# Patient Record
Sex: FEMALE | Race: BLACK OR AFRICAN AMERICAN | NOT HISPANIC OR LATINO | Employment: FULL TIME | URBAN - METROPOLITAN AREA
[De-identification: names, ages, dates, MRNs, and addresses within clinical notes are randomized per-mention and may not be internally consistent; named-entity substitution may affect disease eponyms.]

---

## 2020-01-14 ENCOUNTER — OFFICE VISIT (OUTPATIENT)
Dept: URGENT CARE | Facility: CLINIC | Age: 25
End: 2020-01-14

## 2020-01-14 VITALS
SYSTOLIC BLOOD PRESSURE: 110 MMHG | HEART RATE: 75 BPM | TEMPERATURE: 98 F | HEIGHT: 67 IN | WEIGHT: 117 LBS | BODY MASS INDEX: 18.36 KG/M2 | DIASTOLIC BLOOD PRESSURE: 75 MMHG | OXYGEN SATURATION: 99 %

## 2020-01-14 DIAGNOSIS — M54.50 ACUTE BILATERAL LOW BACK PAIN WITHOUT SCIATICA: ICD-10-CM

## 2020-01-14 DIAGNOSIS — S16.1XXA STRAIN OF NECK MUSCLE, INITIAL ENCOUNTER: Primary | ICD-10-CM

## 2020-01-14 DIAGNOSIS — S29.019A THORACIC MYOFASCIAL STRAIN, INITIAL ENCOUNTER: ICD-10-CM

## 2020-01-14 DIAGNOSIS — S39.012A STRAIN OF LUMBAR REGION, INITIAL ENCOUNTER: ICD-10-CM

## 2020-01-14 DIAGNOSIS — S16.1XXA STRAIN OF NECK MUSCLE, INITIAL ENCOUNTER: ICD-10-CM

## 2020-01-14 DIAGNOSIS — M62.838 MUSCLE SPASM: ICD-10-CM

## 2020-01-14 PROCEDURE — 72050 X-RAY EXAM NECK SPINE 4/5VWS: CPT | Mod: FY,S$GLB,, | Performed by: RADIOLOGY

## 2020-01-14 PROCEDURE — 99213 PR OFFICE/OUTPT VISIT, EST, LEVL III, 20-29 MIN: ICD-10-PCS | Mod: S$GLB,,, | Performed by: NURSE PRACTITIONER

## 2020-01-14 PROCEDURE — 72070 X-RAY EXAM THORAC SPINE 2VWS: CPT | Mod: FY,S$GLB,, | Performed by: RADIOLOGY

## 2020-01-14 PROCEDURE — 99213 OFFICE O/P EST LOW 20 MIN: CPT | Mod: S$GLB,,, | Performed by: NURSE PRACTITIONER

## 2020-01-14 PROCEDURE — 72110 X-RAY EXAM L-2 SPINE 4/>VWS: CPT | Mod: FY,S$GLB,, | Performed by: RADIOLOGY

## 2020-01-14 PROCEDURE — 72050 XR CERVICAL SPINE COMPLETE 5 VIEW: ICD-10-PCS | Mod: FY,S$GLB,, | Performed by: RADIOLOGY

## 2020-01-14 PROCEDURE — 72070 XR THORACIC SPINE AP LATERAL: ICD-10-PCS | Mod: FY,S$GLB,, | Performed by: RADIOLOGY

## 2020-01-14 PROCEDURE — 72110 XR LUMBAR SPINE COMPLETE 5 VIEW: ICD-10-PCS | Mod: FY,S$GLB,, | Performed by: RADIOLOGY

## 2020-01-14 RX ORDER — MELOXICAM 7.5 MG/1
7.5 TABLET ORAL DAILY
Qty: 20 TABLET | Refills: 0 | Status: SHIPPED | OUTPATIENT
Start: 2020-01-14 | End: 2020-01-20 | Stop reason: ALTCHOICE

## 2020-01-14 RX ORDER — CYCLOBENZAPRINE HCL 5 MG
5 TABLET ORAL 3 TIMES DAILY PRN
Qty: 30 TABLET | Refills: 0 | Status: SHIPPED | OUTPATIENT
Start: 2020-01-14 | End: 2020-01-20 | Stop reason: ALTCHOICE

## 2020-01-14 NOTE — PATIENT INSTRUCTIONS
Back Pain (Acute or Chronic)    Back pain is one of the most common problems. The good news is that most people feel better in 1 to 2 weeks, and most of the rest in 1 to 2 months. Most people can remain active.  People experience and describe pain differently; not everyone is the same.  · The pain can be sharp, stabbing, shooting, aching, cramping or burning.  · Movement, standing, bending, lifting, sitting, or walking may worsen pain.  · It can be localized to one spot or area, or it can be more generalized.  · It can spread or radiate upwards, to the front, or go down your arms or legs (sciatica).  · It can cause muscle spasm.  Most of the time, mechanical problems with the muscles or spine cause the pain. Mechanical problems are usually caused by an injury to the muscles or ligaments. While illness can cause back pain, it is usually not caused by a serious illness. Mechanical problems include:   · Physical activity such as sports, exercise, work, or normal activity  · Overexertion, lifting, pushing, pulling incorrectly or too aggressively  · Sudden twisting, bending, or stretching from an accident, or accidental movement  · Poor posture  · Stretching or moving wrong, without noticing pain at the time  · Poor coordination, lack of regular exercise (check with your doctor about this)  · Spinal disc disease or arthritis  · Stress  Pain can also be related to pregnancy, or illness like appendicitis, bladder or kidney infections, pelvic infections, and many other things.  Acute back pain usually gets better in 1 to 2 weeks. Back pain related to disk disease, arthritis in the spinal joints or spinal stenosis (narrowing of the spinal canal) can become chronic and last for months or years.  Unless you had a physical injury (for example, a car accident or fall) X-rays are usually not needed for the initial evaluation of back pain. If pain continues and does not respond to medical treatment, X-rays and other tests may be  needed.  Home care  Try these home care recommendations:  · When in bed, try to find a position of comfort. A firm mattress is best. Try lying flat on your back with pillows under your knees. You can also try lying on your side with your knees bent up towards your chest and a pillow between your knees.  · At first, do not try to stretch out the sore spots. If there is a strain, it is not like the good soreness you get after exercising without an injury. In this case, stretching may make it worse.  · Avoid prolong sitting, long car rides, or travel. This puts more stress on the lower back than standing or walking.  · During the first 24 to 72 hours after an acute injury or flare up of chronic back pain, apply an ice pack to the painful area for 20 minutes and then remove it for 20 minutes. Do this over a period of 60 to 90 minutes or several times a day. This will reduce swelling and pain. Wrap the ice pack in a thin towel or plastic to protect your skin.  · You can start with ice, then switch to heat. Heat (hot shower, hot bath, or heating pad) reduces pain and works well for muscle spasms. Heat can be applied to the painful area for 20 minutes then remove it for 20 minutes. Do this over a period of 60 to 90 minutes or several times a day. Do not sleep on a heating pad. It can lead to skin burns or tissue damage.  · You can alternate ice and heat therapy. Talk with your doctor about the best treatment for your back pain.  · Therapeutic massage can help relax the back muscles without stretching them.  · Be aware of safe lifting methods and do not lift anything without stretching first.  Medicines  Talk to your doctor before using medicine, especially if you have other medical problems or are taking other medicines.  · You may use over-the-counter medicine as directed on the bottle to control pain, unless another pain medicine was prescribed. If you have chronic conditions like diabetes, liver or kidney disease,  stomach ulcers, or gastrointestinal bleeding, or are taking blood thinners, talk to your doctor before taking any medicine.  · Be careful if you are given a prescription medicines, narcotics, or medicine for muscle spasms. They can cause drowsiness, affect your coordination, reflexes, and judgement. Do not drive or operate heavy machinery.  Follow-up care  Follow up with your healthcare provider, or as advised.   A radiologist will review any X-rays that were taken. Your provide will notify you of any new findings that may affect your care.  Call 911  Call emergency services if any of the following occur:  · Trouble breathing  · Confusion  · Very drowsy or trouble awakening  · Fainting or loss of consciousness  · Rapid or very slow heart rate  · Loss of bowel or bladder control  When to seek medical advice  Call your healthcare provider right away if any of these occur:   · Pain becomes worse or spreads to your legs  · Weakness or numbness in one or both legs  · Numbness in the groin or genital area  Date Last Reviewed: 7/1/2016 © 2000-2017 [employer]. [address] [place] [address]. All rights reserved. This information is not intended as a substitute for professional medical care. Always follow your healthcare professional's instructions.        Back Sprain or Strain    Injury to the muscles (strain) or ligaments (sprain) around the spine can be troubling. Injury may occur after a sudden forceful twisting or bending force such as in a car accident, after a simple awkward movement, or after lifting something heavy with poor body positioning. In any case, muscle spasm is often present and adds to the pain.  Thankfully, most people feel better in 1 to 2 weeks, and most of the rest in 1 to 2 months. Most people can remain active. Unless you had a forceful or traumatic physical injury such as a car accident or fall, X-rays may not be ordered for the first evaluation of a back sprain or  strain. If pain continues and does not respond to medical treatment, your healthcare provider may then order X-rays and other tests.  Home care  The following guidelines will help you care for your injury at home:  · When in bed, try to find a comfortable position. A firm mattress is best. Try lying flat on your back with pillows under your knees. You can also try lying on your side with your knees bent up toward your chest and a pillow between your knees.  · Don't sit for long periods. Try not to take long car rides or take other trips that have you sitting for a long time. This puts more stress on the lower back than standing or walking.  · During the first 24 to 72 hours after an injury or flare-up, apply an ice pack to the painful area for 20 minutes. Then remove it for 20 minutes. Do this for 60 to 90 minutes, or several times a day. This will reduce swelling and pain. Be sure to wrap the ice pack in a thin towel or plastic to protect your skin.  · You can start with ice, then switch to heat. Heat from a hot shower, hot bath, or heating pad reduces pain and works well for muscle spasms. Put heat on the painful area for 20 minutes, then remove for 20 minutes. Do this for 60 to 90 minutes, or several times a day. Do not use a heating pad while sleeping. It can burn the skin.  · You can alternate the ice and heat. Talk with your healthcare provider to find out the best treatment or therapy for your back pain.  · Therapeutic massage will help relax the back muscles without stretching them.  · Be aware of safe lifting methods. Do not lift anything over 15 pounds until all of the pain is gone.  Medicines  Talk to your healthcare provider before using medicines, especially if you have other health problems or are taking other medicines.  · You may use acetaminophen or ibuprofen to control pain, unless another pain medicine was prescribed. If you have chronic conditions like diabetes, liver or kidney disease, stomach  ulcers, or gastrointestinal bleeding, or are taking blood-thinner medicines, talk with your doctor before taking any medicines.  · Be careful if you are given prescription medicines, narcotics, or medicine for muscle spasm. They can cause drowsiness, and affect your coordination, reflexes, and judgment. Do not drive or operate heavy machinery when taking these types of medicines. Only take pain medicine as prescribed by your healthcare provider.  Follow-up care  Follow up with your healthcare provider, or as advised. You may need physical therapy or more tests if your symptoms get worse.  If you had X-rays your healthcare provider may be checking for any broken bones, breaks, or fractures. Bruises and sprains can sometimes hurt as much as a fracture. These injuries can take time to heal completely. If your symptoms dont improve or they get worse, talk with your healthcare provider. You may need a repeat X-ray or other tests.  Call 911  Call for emergency care if any of the following occur:  · Trouble breathing  · Confused  · Very drowsy or trouble awakening  · Fainting or loss of consciousness  · Rapid or very slow heart rate  · Loss of bowel or bladder control  When to seek medical advice  Call your healthcare provider right away if any of the following occur:  · Pain gets worse or spreads to your arms or legs  · Weakness or numbness in one or both arms or legs  · Numbness in the groin or genital area  Date Last Reviewed: 6/1/2016  © 6546-0124 [employer]. [address] [place] [id number]. All rights reserved. This information is not intended as a substitute for professional medical care. Always follow your healthcare professional's instructions.        Muscle Spasm  A muscle spasm (also called a cramp) is an involuntary muscle contraction. The muscle tightens quickly and strongly. A hard lump may form in the muscle. Muscle spasms are very painful. Read on to learn more about muscle spasms  and how to treat and prevent them.    What causes muscles to spasm?  Often, the cause of a muscle spasm is not known. Muscle spasm is due to irritation of muscle fibers. Some things can make a muscle spasm more likely. These include:  · Injury  · Heavy exercise  · Overtired muscles  · A muscle held in one position for a long time  · Dehydration  · Low levels of certain minerals in the body  · Taking certain medications, such as diuretics or water pills  · Certain medical conditions, such as kidney failure or diabetes  · Being pregnant  Stopping a muscle spasm  Muscle spasms often come and go quickly. When a muscle goes into spasm, very gently stretch and massage the muscle. This may help calm the muscle fibers. Then rest the muscle.  Preventing muscle spasms  Although there is little or no evidence that staying hydrated, taking certain vitamins or minerals or stretching works to prevent cramps, these measures may help and have other benefits. Talk to your health care provider about steps to take to avoid muscle spasms. These may include:  · Drinking enough fluids to avoid dehydration, especially when you exercise.  · Taking vitamin or mineral supplements.  · Getting regular exercise.  · Stretching regularly, especially before exercise.  · Limit caffeine and smoking.  · Taking a prescription muscle relaxant.  When to call your doctor  Call your doctor if you have any of the following:  · Severe cramping  · Cramping that lasts a long time, does not go away with stretching, or keeps coming back  · Pain, tingling, or weakness in the arms or legs  · Pain that wakes you up at night   Date Last Reviewed: 9/1/2015  © 1561-8709 [employer]. [address] [place] [address], [place] [address]. All rights reserved. This information is not intended as a substitute for professional medical care. Always follow your healthcare professional's instructions.
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
## 2020-01-15 ENCOUNTER — TELEPHONE (OUTPATIENT)
Dept: URGENT CARE | Facility: CLINIC | Age: 25
End: 2020-01-15

## 2020-01-15 RX ORDER — MELOXICAM 7.5 MG/1
TABLET ORAL
Qty: 90 TABLET | OUTPATIENT
Start: 2020-01-15

## 2020-01-15 NOTE — TELEPHONE ENCOUNTER
Called patient and she answered the phone, I'm getting her scheduled to be seen in the [place] [facility] for a RV.

## 2020-01-16 ENCOUNTER — OFFICE VISIT (OUTPATIENT)
Dept: URGENT CARE | Facility: CLINIC | Age: 25
End: 2020-01-16
Payer: OTHER MISCELLANEOUS

## 2020-01-16 DIAGNOSIS — M54.50 ACUTE BILATERAL LOW BACK PAIN WITHOUT SCIATICA: ICD-10-CM
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
## 2020-01-16 DIAGNOSIS — S39.012A STRAIN OF LUMBAR REGION, INITIAL ENCOUNTER: Primary | ICD-10-CM

## 2020-01-16 DIAGNOSIS — M62.830 LUMBAR PARASPINAL MUSCLE SPASM: ICD-10-CM

## 2020-01-16 PROCEDURE — 99203 PR OFFICE/OUTPT VISIT, NEW, LEVL III, 30-44 MIN: ICD-10-PCS | Mod: S$GLB,,, | Performed by: NURSE PRACTITIONER

## 2020-01-16 PROCEDURE — 99203 OFFICE O/P NEW LOW 30 MIN: CPT | Mod: S$GLB,,, | Performed by: NURSE PRACTITIONER

## 2020-01-16 NOTE — PROGRESS NOTES
Subjective:       Patient ID: Evelina Baez is a 24 y.o. female.    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 She states she first felt a catch in her back when lifting the first then lifted a second and it got worse. She states she completed her shift. The next day at work she did a formal report because could not work. W/C f/u- Lower back- Patient was seen at  Urgent Care, patient was seen on 1/14/2020 for injury. She injured her back trying to catch heavy boxes from falling and also lifting box. Today she comes in with pain in her lower back that seems to have increased with muscle spasms. Pain level 9/10 on today. She is currently taking Flexeril and Meloxicam. [place]    Back Pain   This is a new problem. The current episode started in the past 7 days. The problem occurs constantly. The problem has been gradually worsening since onset. The pain is present in the lumbar spine. The quality of the pain is described as burning, stabbing and shooting. The pain does not radiate (radiates up to her mid-back). The pain is at a severity of 9/10. The pain is severe. The pain is worse during the night. The symptoms are aggravated by lying down. Stiffness is present at night. Pertinent negatives include no abdominal pain, bladder incontinence, bowel incontinence, chest pain, dysuria, headaches, leg pain, numbness, paresis, paresthesias, pelvic pain, perianal numbness, tingling, weakness or weight loss. She has tried muscle relaxant, NSAIDs and ice for the symptoms. The treatment provided mild relief.       Constitution: Negative for fatigue.   HENT: Negative.    Neck: negative.   Cardiovascular: Negative.  Negative for chest pain.   Eyes: Negative.    Respiratory: Negative.    Gastrointestinal: Negative for abdominal pain and bowel incontinence.   Genitourinary: Negative for dysuria,  urgency, bladder incontinence, hematuria and pelvic pain.   Musculoskeletal: Positive for pain, back pain and muscle cramps. Negative for history of spine disorder.   Skin: Negative for rash.   Allergic/Immunologic: Negative.    Neurological: Negative for coordination disturbances, headaches, numbness and tingling.   Hematologic/Lymphatic: Negative.    Psychiatric/Behavioral: Negative.         Objective:      Physical Exam   Musculoskeletal:        Lumbar back: She exhibits tenderness, pain and spasm.        Back:        Assessment:       1. Strain of lumbar region, initial encounter    2. Acute bilateral low back pain without sciatica    3. Lumbar paraspinal muscle spasm    4. Work related injury        Plan:       Work status per CA17     Patient Instructions: Attention not to aggravate affected area, Apply ice 24-48 hours then apply heat/warm soaks, Daily home exercises/warm soaks   Restrictions: Disabled until next office visit(on muscle relaxants for spasm; do not operate heavy machinery and drive while taking medication)  Follow up in about 4 days (around 1/20/2020).

## 2020-01-16 NOTE — LETTER
[facility]  [address]  [place] [address]  Phone: [phone]  Fax: [phone]  [facility]: [id number]    Pt Name: Evelina Baez  Injury Date: 01/13/2020   Employee ID: [id number] Date of First Treatment: 01/16/2020   Company: [employer]      Appointment Time: 11:30 AM Arrived: 11:30 AM   Provider: ARIAS Monaco Time Out: 12:55 PM     Office Treatment:   EXAM  DISABLED UNTIL NEXT OFFICE VISIT     1. Strain of lumbar region, initial encounter    2. Acute bilateral low back pain without sciatica    3. Lumbar paraspinal muscle spasm    4. Work related injury          Patient Instructions: Attention not to aggravate affected area, Apply ice 24-48 hours then apply heat/warm soaks, Daily home exercises/warm soaks    Restrictions: Disabled until next office visit(on muscle relaxants for spasm; do not operate heavy machinery and drive while taking medication)     Return Appointment: 01/20/2020 at 9:30 AM  EDWIN

## 2020-01-20 ENCOUNTER — OFFICE VISIT (OUTPATIENT)
Dept: URGENT CARE | Facility: CLINIC | Age: 25
End: 2020-01-20
Payer: OTHER MISCELLANEOUS

## 2020-01-20 DIAGNOSIS — S39.012D STRAIN OF LUMBAR REGION, SUBSEQUENT ENCOUNTER: ICD-10-CM

## 2020-01-20 DIAGNOSIS — M54.50 ACUTE BILATERAL LOW BACK PAIN WITHOUT SCIATICA: Primary | ICD-10-CM

## 2020-01-20 DIAGNOSIS — M62.830 LUMBAR PARASPINAL MUSCLE SPASM: ICD-10-CM

## 2020-01-20 PROCEDURE — 99214 PR OFFICE/OUTPT VISIT, EST, LEVL IV, 30-39 MIN: ICD-10-PCS | Mod: S$GLB,,, | Performed by: NURSE PRACTITIONER

## 2020-01-20 PROCEDURE — 99214 OFFICE O/P EST MOD 30 MIN: CPT | Mod: S$GLB,,, | Performed by: NURSE PRACTITIONER

## 2020-01-20 RX ORDER — NAPROXEN 500 MG/1
500 TABLET ORAL 2 TIMES DAILY
Qty: 30 TABLET | Refills: 0 | Status: SHIPPED | OUTPATIENT
Start: 2020-01-20 | End: 2020-02-05 | Stop reason: SDUPTHER

## 2020-01-20 RX ORDER — PREDNISONE 20 MG/1
40 TABLET ORAL DAILY
Qty: 10 TABLET | Refills: 0 | Status: SHIPPED | OUTPATIENT
Start: 2020-01-20 | End: 2020-01-25

## 2020-01-20 RX ORDER — METHOCARBAMOL 500 MG/1
1000 TABLET, FILM COATED ORAL 3 TIMES DAILY PRN
Qty: 30 TABLET | Refills: 0 | Status: SHIPPED | OUTPATIENT
Start: 2020-01-20 | End: 2020-01-27

## 2020-01-20 NOTE — LETTER
[facility]  [address] [place] [address]  Phone: [phone]  Fax: [phone]  [facility]: [id number]    Pt Name: Evelina Baez  Injury Date: 01/13/2020   Employee ID: [id number] Date of Treatment: 01/20/2020   Company: [employer]      Appointment Time: 09:15 AM Arrived: 9:15 a.m.   Provider: ARIAS Monaco Time Out: 9:49 a.m.     Office Treatment:   EXAM  Prescriptions  Restrictions: No Prolonged standing/walking, No lifting/pushing/pulling more than 10 lbs, Avoid frequent bending/lifting/twisting, Avoid climbing/kneeling/squatting  Patient Instructions: Attention not to aggravate affected area, Apply ice 24-48 hours then apply heat/warm soaks    1. Acute bilateral low back pain without sciatica    2. Lumbar paraspinal muscle spasm    3. Strain of lumbar region, subsequent encounter      Medications Ordered This Encounter   Medications    methocarbamol (ROBAXIN) 500 MG Tab    naproxen (EC NAPROSYN) 500 MG EC tablet    predniSONE (DELTASONE) 20 MG tablet      Patient Instructions: Attention not to aggravate affected area, Apply ice 24-48 hours then apply heat/warm soaks    Restrictions: No Prolonged standing/walking, No lifting/pushing/pulling more than 10 lbs, Avoid frequent bending/lifting/twisting, Avoid climbing/kneeling/squatting     Return Appointment: 1/27/2020 at 10:30 a.m.  [place]

## 2020-01-20 NOTE — LETTER
[facility]  [place]  [address] [place] [place] [address]  Phone: [phone]  Fax: [phone]  [facility]: [id number]     Name: Evelina Mcnair Date: 01/13/2020   Employee ID: [id number] Date of Treatment: 01/20/2020   Company:  [employer]      Appointment Time: 09:15 AM Arrived: 9:15 a.m.   Provider: ARIAS Monaco Time Out: 9:49 a.m.     Office Treatment:   EXAM  Prescriptions  Restrictions: Disabled until next office visit  Patient Instructions: Attention not to aggravate affected area, Apply ice 24-48 hours then apply heat/warm soaks    1. Acute bilateral low back pain without sciatica    2. Lumbar paraspinal muscle spasm    3. Strain of lumbar region, subsequent encounter      Medications Ordered This Encounter   Medications    methocarbamol (ROBAXIN) 500 MG Tab    naproxen (EC NAPROSYN) 500 MG EC tablet    predniSONE (DELTASONE) 20 MG tablet      Patient Instructions: Attention not to aggravate affected area, Apply ice 24-48 hours then apply heat/warm soaks    Restrictions: Disabled until next office visit     Return Appointment: 1/27/2020 at 10:30 a.m.  [place]

## 2020-01-20 NOTE — PATIENT INSTRUCTIONS
Exercises to Strengthen Your Lower Back  Strong lower back and abdominal muscles work together to support your spine. The exercises below will help strengthen the lower back. It is important that you begin exercising slowly and increase levels gradually.  Always begin any exercise program with stretching. If you feel pain while doing any of these exercises, stop and talk to your doctor about a more specific exercise program that better suits your condition.   Low back stretch  The point of stretching is to make you more flexible and increase your range of motion. Stretch only as much as you are able. Stretch slowly. Do not push your stretch to the limit. If at any point you feel pain while stretching, this is your (temporary) limit.  · Lie on your back with your knees bent and both feet on the ground.  · Slowly raise your left knee to your chest as you flatten your lower back against the floor. Hold for 5 seconds.  · Relax and repeat the exercise with your right knee.  · Do 10 of these exercises for each leg.  · Repeat hugging both knees to your chest at the same time.  Building lower back strength  Start your exercise routine with 10 to 30 minutes a day, 1 to 3 times a day.  Initial exercises  Lying on your back:  1. Ankle pumps: Move your foot up and down, towards your head, and then away. Repeat 10 times with each foot.  2. Heel slides: Slowly bend your knee, drawing the heel of your foot towards you. Then slide your heel/foot from you, straightening your knee. Do not lift your foot off the floor (this is not a leg lift).  3. Abdominal contraction: Bend your knees and put your hands on your stomach. Tighten your stomach muscles. Hold for 5 seconds, then relax. Repeat 10 times.  4. Straight leg raise: Bend one leg at the knee and keep the other leg straight. Tighten your stomach muscles. Slowly lift your straight leg 6 to 12 inches off the floor and hold for up to 5 seconds. Repeat 10 times on each  side.  Standin. Wall squats: Stand with your back against the wall. Move your feet about 12 inches away from the wall. Tighten your stomach muscles, and slowly bend your knees until they are at about a 45 degree angle. Do not go down too far. Hold about 5 seconds. Then slowly return to your starting position. Repeat 10 times.  2. Heel raises: Stand facing the wall. Slowly raise the heels of your feet up and down, while keeping your toes on the floor. If you have trouble balancing, you can touch the wall with your hands. Repeat 10 times.  More advanced exercises  When you feel comfortable enough, try these exercises.  1. Kneeling lumbar extension: Begin on your hands and knees. At the same time, raise and straighten your right arm and left leg until they are parallel to the ground. Hold for 2 seconds and come back slowly to a starting position. Repeat with left arm and right leg, alternating 10 times.  2. Prone lumbar extension: Lie face down, arms extended overhead, palms on the floor. At the same time, raise your right arm and left leg as high as comfortably possible. Hold for 10 seconds and slowly return to start. Repeat with left arm and right leg, alternating 10 times. Gradually build up to 20 times. (Advanced: Repeat this exercise raising both arms and both legs a few inches off the floor at the same time. Hold for 5 seconds and release.)  3. Pelvic tilt: Lie on the floor on your back with your knees bent at 90 degrees. Your feet should be flat on the floor. Inhale, exhale, then slowly contract your abdominal muscles bringing your navel toward your spine. Let your pelvis rock back until your lower back is flat on the floor. Hold for 10 seconds while breathing smoothly.  4. Abdominal crunch: Perform a pelvic tilt (above) flattening your lower back against the floor. Holding the tension in your abdominal muscles, take another breath and raise your shoulder blades off the ground (this is not a full sit-up).  Keep your head in line with your body (dont bend your neck forward). Hold for 2 seconds, then slowly lower.  Date Last Reviewed: 6/1/2016  © 9668-1503 [employer]. [address] [place] [address], [place] [address]. All rights reserved. This information is not intended as a substitute for professional medical care. Always follow your healthcare professional's instructions.

## 2020-01-29 ENCOUNTER — TELEPHONE (OUTPATIENT)
Dept: URGENT CARE | Facility: CLINIC | Age: 25
End: 2020-01-29

## 2020-01-29 ENCOUNTER — OFFICE VISIT (OUTPATIENT)
Dept: URGENT CARE | Facility: CLINIC | Age: 25
End: 2020-01-29

## 2020-01-29 DIAGNOSIS — S29.019D THORACIC MYOFASCIAL STRAIN, SUBSEQUENT ENCOUNTER: ICD-10-CM

## 2020-01-29 DIAGNOSIS — S39.012D STRAIN OF LUMBAR REGION, SUBSEQUENT ENCOUNTER: Primary | ICD-10-CM
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
## 2020-01-29 PROCEDURE — 99214 PR OFFICE/OUTPT VISIT, EST, LEVL IV, 30-39 MIN: ICD-10-PCS | Mod: S$GLB,,, | Performed by: PHYSICIAN ASSISTANT

## 2020-01-29 PROCEDURE — 99214 OFFICE O/P EST MOD 30 MIN: CPT | Mod: S$GLB,,, | Performed by: PHYSICIAN ASSISTANT

## 2020-01-29 RX ORDER — TIZANIDINE 4 MG/1
4 TABLET ORAL 2 TIMES DAILY PRN
Qty: 20 TABLET | Refills: 0 | Status: SHIPPED | OUTPATIENT
Start: 2020-01-29

## 2020-01-29 NOTE — LETTER
[facility]  [address] [place] [address]  Phone: [phone]  Fax: [phone]  [facility]: [id number]    Pt Name: Evelina Baez  Injury Date: 01/13/2020   Employee ID: [id number] Date of  Treatment: 01/29/2020   Company: [employer]      Appointment Time: 10:45 AM Arrived: 11:05 AM   Provider: Raúl Jenkins PA-C Time Out: 12:00 PM     Office Treatment:   EXAM  RX GIVEN  LIGHT DUTY     1. Strain of lumbar region, subsequent encounter    2. Thoracic myofascial strain, subsequent encounter    3. Work related injury      Medications Ordered This Encounter   Medications    tiZANidine (ZANAFLEX) 4 MG tablet      Patient Instructions: Daily home exercises/warm soaks, PT to be scheduled once authorized(Continue naproxen twice daily. Stop Methocarbamol)    Restrictions: (See CA-17)     Return Appointment: 02/05/2020 at 10:00 AM  EDWIN

## 2020-01-29 NOTE — PROGRESS NOTES
Subjective:       Patient ID: Evelina Baez is a 24 y.o. female.    Chief Complaint: Back Pain    W/C f/u- Lower back- Patient states her back does not feel any better. She states she has muscle spasms radiating up her back. Medication does not seem to be working. Pain level 6/10 on today. IJ    Back Pain   This is a recurrent problem. The current episode started 1 to 4 weeks ago. The problem occurs daily. The problem has been gradually improving since onset. The pain is present in the lumbar spine. The quality of the pain is described as stabbing and burning. The pain is at a severity of 6/10. The pain is moderate. The pain is worse during the night. The symptoms are aggravated by lying down and twisting. Stiffness is present at night. Pertinent negatives include no abdominal pain, bladder incontinence, bowel incontinence, chest pain, dysuria, fever, headaches, leg pain, numbness, paresis, paresthesias, pelvic pain, perianal numbness, tingling or weakness. She has tried heat, ice and NSAIDs for the symptoms. The treatment provided mild relief.       Constitution: Negative for fatigue and fever.   Cardiovascular: Negative for chest pain.   Gastrointestinal: Negative for abdominal pain and bowel incontinence.   Genitourinary: Negative for dysuria, urgency, bladder incontinence, hematuria and pelvic pain.   Musculoskeletal: Positive for pain and back pain. Negative for muscle cramps and history of spine disorder.   Skin: Negative for rash.   Neurological: Negative for coordination disturbances, headaches, numbness and tingling.        Objective:      Physical Exam   Constitutional: She appears well-developed and well-nourished. She is active. No distress.   HENT:   Head: Normocephalic and atraumatic.   Right Ear: Hearing and external ear normal.   Left Ear: Hearing and external ear normal.   Nose: Nose normal. No nasal deformity. No epistaxis.   Mouth/Throat: Oropharynx is clear and moist and mucous membranes are  normal.   Eyes: Conjunctivae and lids are normal. Right conjunctiva is not injected. Left conjunctiva is not injected.   Neck: Trachea normal and normal range of motion. No spinous process tenderness and no muscular tenderness present.   Cardiovascular: Intact distal pulses and normal pulses.   Pulses:       Dorsalis pedis pulses are 2+ on the right side, and 2+ on the left side.        Posterior tibial pulses are 2+ on the right side, and 2+ on the left side.   Pulmonary/Chest: Effort normal. No stridor. No respiratory distress.   Abdominal: Normal appearance.   Musculoskeletal:        Cervical back: Normal.        Thoracic back: Normal.        Lumbar back: She exhibits decreased range of motion and tenderness. She exhibits no deformity and normal pulse.        Back:    Neurological: She is alert. She has normal strength and normal reflexes. No sensory deficit. GCS eye subscore is 4. GCS verbal subscore is 5. GCS motor subscore is 6.   Reflex Scores:       Patellar reflexes are 2+ on the right side and 2+ on the left side.       Achilles reflexes are 2+ on the right side and 2+ on the left side.  SLR negative bilaterally.    Skin: Skin is warm, dry and intact. No abrasion and no bruising noted.   Psychiatric: She has a normal mood and affect. Her speech is normal and behavior is normal. Thought content normal. Cognition and memory are normal. She is attentive.   Nursing note and vitals reviewed.      Assessment:       1. Strain of lumbar region, subsequent encounter    2. Thoracic myofascial strain, subsequent encounter    3. Work related injury        Plan:         Medications Ordered This Encounter   Medications    tiZANidine (ZANAFLEX) 4 MG tablet     Sig: Take 1 tablet (4 mg total) by mouth 2 (two) times daily as needed (muscle spasms). Take off duty only. May cause drowsiness.     Dispense:  20 tablet     Refill:  0     Patient Instructions: Daily home exercises/warm soaks, PT to be scheduled once  authorized(Continue naproxen twice daily. Stop Methocarbamol)   Restrictions: (See CA-17)  Follow up in about 1 week (around 2/5/2020).        Patient Instructions       Understanding Lumbosacral Strain    Lumbosacral strain is a medical term for an injury that causes low back pain. The lumbosacral area (low back) is between the bottom of the ribcage and the top of the buttocks. A strain is tearing of muscles and tendons. These tears can be very small but still cause pain.  How a lumbosacral strain happens  Muscles and tendons connected to the spine can be strained in a number of ways:  · Sitting or standing in the same position for long periods of time. This can harm the low back over time. Poor posture can make low back pain more likely.  · Moving the muscles and tendons past their usual range of motion. This can cause a sudden injury. This can happen when you twist, bend over, or lift something heavy. Not using correct technique for sports or tasks like lifting can make back injury more likely.  · Accidents or falls  Lumbosacral strain can be caused by other problems, but these are less common.  Symptoms of lumbosacral strain  Symptoms may include:  · Pain in the back, often on one side  · Pain that gets worse with movement and gets better with rest  · Inability to move as freely as usual  · Swelling, slight redness, and skin warmth in the painful area  Treatment for lumbosacral strain  Low back pain often goes away by itself within several weeks. But it often comes back. Treatment focuses on reducing pain and avoiding further injury. Bed rest is usually not recommended for low back pain. Treatments may include:  · Avoiding or changing the action that caused the problem. This helps prevent injuring the tissues again.  · Prescription or over-the-counter pain medicines. These help reduce inflammation, swelling, and pain.  · Cold or heat packs. These help reduce pain and swelling.  · Stretching and other exercises.  These improve flexibility and strength.  · Physical therapy. This usually includes exercises and other treatments.  · Injections of medicine. This may relieve symptoms.  If these treatments do not relieve symptoms, your healthcare provider may order imaging tests to learn more about the problem. Sometimes you may need surgery.  Possible complications of lumbosacral strain  If the cause of the pain is not addressed, symptoms may return or get worse. Follow your healthcare providers instructions on lifestyle changes and treating your back.     When to call your healthcare provider  Call your healthcare provider right away if you have any of these:  · Fever of 100.4°F (38°C) or higher, or as directed  · Numbness, tingling, or weakness  · Problems with bowel or bladder control, or problems having sex  · Pain that does not go away, or gets worse  · New symptoms   Date Last Reviewed: 3/10/2016  © 4548-5206 [employer]. [address] [place] [address]. All rights reserved. This information is not intended as a substitute for professional medical care. Always follow your healthcare professional's instructions.        Lumbar Stretch (Flexibility)    1. Lie on your back on the floor, with your knees bent and your feet flat on the floor. Dont press your neck or lower back to the floor.  2. Pull one knee up toward your chest. Clasp your hands under your thigh to help pull.  3. Hold for 30 to 60 seconds. Lower your leg back down to the floor.  4. Repeat 2 times, or as instructed.  5. Switch legs and repeat.  Date Last Reviewed: 3/10/2016  © 4322-9266 [employer]. [address] [place] [address]. All rights reserved. This information is not intended as a substitute for professional medical care. Always follow your healthcare professional's instructions.        Lumbar Flexion (Flexibility)    6. Lie on your back on the floor, with your knees bent and your feet flat on the  floor.  7. Gently pull your knees up toward your chest. Put your hands under your thighs to help pull your knees up.  8. Press your lower back down to the floor. Hold for 20 seconds.  9. Lower your legs back down to the floor and relax.  10. Repeat 2 times, or as instructed.  Date Last Reviewed: 3/10/2016  © 1377-0221 [employer]. [address] [place] [address], [place] [id number]. All rights reserved. This information is not intended as a substitute for professional medical care. Always follow your healthcare professional's instructions.

## 2020-01-29 NOTE — TELEPHONE ENCOUNTER
Pt auth to release records completed and emailed to pt at her request. Pt also requested additional days off wanting to return to work on next week, informed pt that I discussed with provider and he was not going to be able to change her release. flavia

## 2020-01-29 NOTE — PATIENT INSTRUCTIONS
Understanding Lumbosacral Strain    Lumbosacral strain is a medical term for an injury that causes low back pain. The lumbosacral area (low back) is between the bottom of the ribcage and the top of the buttocks. A strain is tearing of muscles and tendons. These tears can be very small but still cause pain.  How a lumbosacral strain happens  Muscles and tendons connected to the spine can be strained in a number of ways:  · Sitting or standing in the same position for long periods of time. This can harm the low back over time. Poor posture can make low back pain more likely.  · Moving the muscles and tendons past their usual range of motion. This can cause a sudden injury. This can happen when you twist, bend over, or lift something heavy. Not using correct technique for sports or tasks like lifting can make back injury more likely.  · Accidents or falls  Lumbosacral strain can be caused by other problems, but these are less common.  Symptoms of lumbosacral strain  Symptoms may include:  · Pain in the back, often on one side  · Pain that gets worse with movement and gets better with rest  · Inability to move as freely as usual  · Swelling, slight redness, and skin warmth in the painful area  Treatment for lumbosacral strain  Low back pain often goes away by itself within several weeks. But it often comes back. Treatment focuses on reducing pain and avoiding further injury. Bed rest is usually not recommended for low back pain. Treatments may include:  · Avoiding or changing the action that caused the problem. This helps prevent injuring the tissues again.  · Prescription or over-the-counter pain medicines. These help reduce inflammation, swelling, and pain.  · Cold or heat packs. These help reduce pain and swelling.  · Stretching and other exercises. These improve flexibility and strength.  · Physical therapy. This usually includes exercises and other treatments.  · Injections of medicine. This may relieve  symptoms.  If these treatments do not relieve symptoms, your healthcare provider may order imaging tests to learn more about the problem. Sometimes you may need surgery.  Possible complications of lumbosacral strain  If the cause of the pain is not addressed, symptoms may return or get worse. Follow your healthcare providers instructions on lifestyle changes and treating your back.     When to call your healthcare provider  Call your healthcare provider right away if you have any of these:  · Fever of 100.4°F (38°C) or higher, or as directed  · Numbness, tingling, or weakness  · Problems with bowel or bladder control, or problems having sex  · Pain that does not go away, or gets worse  · New symptoms   Date Last Reviewed: 3/10/2016  © 8351-4406 [employer]. [address] [place] [address]. All rights reserved. This information is not intended as a substitute for professional medical care. Always follow your healthcare professional's instructions.        Lumbar Stretch (Flexibility)    1. Lie on your back on the floor, with your knees bent and your feet flat on the floor. Dont press your neck or lower back to the floor.  2. Pull one knee up toward your chest. Clasp your hands under your thigh to help pull.  3. Hold for 30 to 60 seconds. Lower your leg back down to the floor.  4. Repeat 2 times, or as instructed.  5. Switch legs and repeat.  Date Last Reviewed: 3/10/2016  © 6793-6832 [employer]. [address] [place] [address]. All rights reserved. This information is not intended as a substitute for professional medical care. Always follow your healthcare professional's instructions.        Lumbar Flexion (Flexibility)    6. Lie on your back on the floor, with your knees bent and your feet flat on the floor.  7. Gently pull your knees up toward your chest. Put your hands under your thighs to help pull your knees up.  8. Press your lower back down to the floor. Hold  for 20 seconds.  9. Lower your legs back down to the floor and relax.  10. Repeat 2 times, or as instructed.  Date Last Reviewed: 3/10/2016  © 5854-4788 [employer]. [address] [place] [address], [place] [address]. All rights reserved. This information is not intended as a substitute for professional medical care. Always follow your healthcare professional's instructions.

## 2020-01-30 ENCOUNTER — OFFICE VISIT (OUTPATIENT)
Dept: URGENT CARE | Facility: CLINIC | Age: 25
End: 2020-01-30

## 2020-01-30 DIAGNOSIS — S39.012D STRAIN OF LUMBAR REGION, SUBSEQUENT ENCOUNTER: Primary | ICD-10-CM

## 2020-01-30 DIAGNOSIS — S29.019D THORACIC MYOFASCIAL STRAIN, SUBSEQUENT ENCOUNTER: ICD-10-CM

## 2020-01-30 PROCEDURE — 99214 PR OFFICE/OUTPT VISIT, EST, LEVL IV, 30-39 MIN: ICD-10-PCS | Mod: S$GLB,,, | Performed by: PREVENTIVE MEDICINE

## 2020-01-30 PROCEDURE — 99214 OFFICE O/P EST MOD 30 MIN: CPT | Mod: S$GLB,,, | Performed by: PREVENTIVE MEDICINE

## 2020-01-30 NOTE — LETTER
[facility]  [address] [place] [address]  Phone: [phone]  Fax: [phone]  [facility]: [id number]    Pt Name: Evelina Baez  Injury Date: 01/13/2020   Employee ID: [id number] Date of Treatment: 01/30/2020   Company: [employer]      Appointment Time: 01:20 PM Arrived: 1:30 PM   Provider: Chapito Monson MD Time Out: 3:10 PM     Office Treatment:   EXAM  DISABLED UNTIL 02/02/2020  LIGHT DUTY BEGAN 02/03/2020    1. Strain of lumbar region, subsequent encounter    2. Thoracic myofascial strain, subsequent encounter          Patient Instructions: Daily home exercises/warm soaks    Restrictions: Disabled until next office visit, No lifting/pushing/pulling more than 10 lbs(Patient will be disabled from work until February 3rd 2020 she will return to light duty at that time.)     Return Appointment: 02/05/2020 at 10:30 AM  EDWIN

## 2020-01-30 NOTE — PROGRESS NOTES
Subjective:       Patient ID: Evelina Baez is a 24 y.o. female.    Chief Complaint: Back Pain    W/C f/u- Lower back- Patient states her back does not feel any better. She states she has muscle spasms radiating up her back. Pain level 10/10 on today. IJ    Back Pain   This is a recurrent problem. The current episode started 1 to 4 weeks ago. The problem occurs daily. The problem has been gradually improving since onset. The pain is present in the lumbar spine. The quality of the pain is described as stabbing and burning. The pain is at a severity of 10/10. The pain is moderate. The pain is worse during the night. The symptoms are aggravated by lying down and twisting. Stiffness is present at night. Pertinent negatives include no abdominal pain, bladder incontinence, bowel incontinence, chest pain, dysuria, fever, headaches, leg pain, numbness, paresis, paresthesias, pelvic pain, perianal numbness, tingling or weakness. She has tried heat, ice and NSAIDs for the symptoms. The treatment provided mild relief.       Constitution: Negative for fever.   Cardiovascular: Negative for chest pain.   Gastrointestinal: Negative for abdominal pain and bowel incontinence.   Genitourinary: Negative for dysuria, bladder incontinence and pelvic pain.   Musculoskeletal: Positive for back pain.   Neurological: Negative for headaches and numbness.        Objective:      Physical Exam   Constitutional: She appears well-developed and well-nourished.   HENT:   Head: Normocephalic and atraumatic.   Eyes: Pupils are equal, round, and reactive to light. EOM are normal.   Neck: Normal range of motion. Neck supple.   Cardiovascular: Normal rate and regular rhythm.   Pulmonary/Chest: Effort normal and breath sounds normal.   Musculoskeletal:        Thoracic back: She exhibits decreased range of motion, tenderness and pain. She exhibits no bony tenderness, no swelling, no edema, no deformity and no laceration.        Lumbar back: She  exhibits decreased range of motion, tenderness and pain. She exhibits no bony tenderness, no swelling, no edema, no deformity, no laceration, no spasm and normal pulse.        Back:    Patient has increased pain about her lower back with flexion of her back to 45°, extension to 25°, and lateral bending and rotation to 25°.  She has no swelling or ecchymosis noted about her lower back.  She has complaints of pain with light touch about the lumbar and thoracic spine.  She has no focal neurologic deficits about her lower extremities.    Patient does show evidence of symptom magnification with disproportion complaints of pain on light touch about her lower back.  Pain with axial loading and pseudo trunk rotation.   Neurological: She is alert.   No focal neurologic deficits   Skin: Skin is warm.   Psychiatric: She has a normal mood and affect.   Nursing note and vitals reviewed.      Assessment:       1. Strain of lumbar region, subsequent encounter    2. Thoracic myofascial strain, subsequent encounter        Plan:     this patient will continue taking Naprosyn 500 mg b.i.d. with food during the day.  She will take tizanidine at night as needed.  She was instructed to do exercises for her lower back with warm soaks on a daily basis.  She is scheduled for physical therapy once this is authorized.       Patient Instructions: Daily home exercises/warm soaks   Restrictions: Disabled until next office visit, No lifting/pushing/pulling more than 10 lbs(Patient will be disabled from work until February 3rd 2020 she will return to light duty at that time.)  Follow up in about 6 days (around 2/5/2020).

## 2020-02-05 ENCOUNTER — OFFICE VISIT (OUTPATIENT)
Dept: URGENT CARE | Facility: CLINIC | Age: 25
End: 2020-02-05

## 2020-02-05 DIAGNOSIS — S39.012D STRAIN OF LUMBAR REGION, SUBSEQUENT ENCOUNTER: Primary | ICD-10-CM
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
## 2020-02-05 DIAGNOSIS — S29.019D THORACIC MYOFASCIAL STRAIN, SUBSEQUENT ENCOUNTER: ICD-10-CM

## 2020-02-05 PROCEDURE — 99214 OFFICE O/P EST MOD 30 MIN: CPT | Mod: S$GLB,,, | Performed by: PHYSICIAN ASSISTANT

## 2020-02-05 PROCEDURE — 99214 PR OFFICE/OUTPT VISIT, EST, LEVL IV, 30-39 MIN: ICD-10-PCS | Mod: S$GLB,,, | Performed by: PHYSICIAN ASSISTANT

## 2020-02-05 RX ORDER — NAPROXEN 500 MG/1
500 TABLET ORAL 2 TIMES DAILY
Qty: 30 TABLET | Refills: 0 | Status: SHIPPED | OUTPATIENT
Start: 2020-02-05 | End: 2020-02-20

## 2020-02-05 NOTE — PROGRESS NOTES
Subjective:       Patient ID: Evelina Baez is a 24 y.o. female.    Chief Complaint: Back Pain    W/C f/u- Lower back- Patient states her back does not feel any better. She states she has muscle spasms radiating up her back. Pain level 8/10 on today. IJ    Back Pain   This is a recurrent problem. The current episode started 1 to 4 weeks ago. The problem occurs daily. The problem has been gradually improving since onset. The pain is present in the lumbar spine. The quality of the pain is described as stabbing and burning. The pain is at a severity of 8/10. The pain is moderate. The pain is worse during the night. The symptoms are aggravated by lying down and twisting. Stiffness is present at night. Pertinent negatives include no abdominal pain, bladder incontinence, bowel incontinence, chest pain, dysuria, fever, headaches, leg pain, numbness, paresis, paresthesias, pelvic pain, perianal numbness, tingling or weakness. She has tried heat, ice and NSAIDs for the symptoms. The treatment provided mild relief.       Constitution: Negative for fever.   Cardiovascular: Negative for chest pain.   Gastrointestinal: Negative for abdominal pain and bowel incontinence.   Genitourinary: Negative for dysuria, bladder incontinence and pelvic pain.   Musculoskeletal: Positive for back pain.   Neurological: Negative for headaches and numbness.        Objective:      Physical Exam   Constitutional: She appears well-developed and well-nourished. She is active. No distress.   HENT:   Head: Normocephalic and atraumatic.   Right Ear: Hearing and external ear normal.   Left Ear: Hearing and external ear normal.   Nose: Nose normal. No nasal deformity. No epistaxis.   Mouth/Throat: Oropharynx is clear and moist and mucous membranes are normal.   Eyes: Conjunctivae and lids are normal. Right conjunctiva is not injected. Left conjunctiva is not injected.   Neck: Trachea normal and normal range of motion. No spinous process tenderness  and no muscular tenderness present.   Cardiovascular: Intact distal pulses and normal pulses.   Pulses:       Dorsalis pedis pulses are 2+ on the right side, and 2+ on the left side.        Posterior tibial pulses are 2+ on the right side, and 2+ on the left side.   Pulmonary/Chest: Effort normal. No stridor. No respiratory distress.   Abdominal: Normal appearance.   Musculoskeletal:        Cervical back: Normal.        Thoracic back: Normal.        Lumbar back: She exhibits decreased range of motion and tenderness. She exhibits no deformity and normal pulse.        Back:    Neurological: She is alert. She has normal strength. No sensory deficit. GCS eye subscore is 4. GCS verbal subscore is 5. GCS motor subscore is 6.   Reflex Scores:       Patellar reflexes are 1+ on the right side and 1+ on the left side.       Achilles reflexes are 1+ on the right side and 1+ on the left side.  SLR negative bilaterally.    Skin: Skin is warm, dry and intact. No abrasion and no bruising noted.   Psychiatric: She has a normal mood and affect. Her speech is normal and behavior is normal. Thought content normal. Cognition and memory are normal. She is attentive.   Nursing note reviewed.      Assessment:       1. Strain of lumbar region, subsequent encounter    2. Thoracic myofascial strain, subsequent encounter    3. Work related injury        Plan:         Medications Ordered This Encounter   Medications    naproxen (EC NAPROSYN) 500 MG EC tablet     Sig: Take 1 tablet (500 mg total) by mouth 2 (two) times daily. for 15 days     Dispense:  30 tablet     Refill:  0     Patient Instructions: Daily home exercises/warm soaks, PT to be scheduled once authorized   Restrictions: (See CA-17)  Follow up in about 8 days (around 2/13/2020) for Dr. Monson.

## 2020-02-13 ENCOUNTER — OFFICE VISIT (OUTPATIENT)
Dept: URGENT CARE | Facility: CLINIC | Age: 25
End: 2020-02-13

## 2020-02-13 DIAGNOSIS — S39.012D STRAIN OF LUMBAR REGION, SUBSEQUENT ENCOUNTER: Primary | ICD-10-CM

## 2020-02-13 DIAGNOSIS — S29.019D THORACIC MYOFASCIAL STRAIN, SUBSEQUENT ENCOUNTER: ICD-10-CM

## 2020-02-13 PROCEDURE — 99214 OFFICE O/P EST MOD 30 MIN: CPT | Mod: S$GLB,,, | Performed by: PREVENTIVE MEDICINE

## 2020-02-13 PROCEDURE — 99214 PR OFFICE/OUTPT VISIT, EST, LEVL IV, 30-39 MIN: ICD-10-PCS | Mod: S$GLB,,, | Performed by: PREVENTIVE MEDICINE

## 2020-02-13 NOTE — PROGRESS NOTES
Subjective:       Patient ID: Evelina Baez is a 24 y.o. female.    Chief Complaint: Back Injury (Lower)    W/C f/u- Lower back- Patient states her back feels worse since returning to work on light duty. She describes having muscle spasms as a stabbing sharp shooting pain that comes and goes. Pain level 8/10 on today. She continues to take Naproxen and Tizanidine medication. Naproxen wears off within 2-3 hours, does not last long while she is at work. [place]    Back Pain   This is a recurrent problem. The current episode started more than 1 month ago. The problem occurs 2 to 4 times per day. The problem has been gradually worsening since onset. The pain is present in the lumbar spine. The quality of the pain is described as shooting and stabbing. The pain is at a severity of 8/10. The pain is moderate. The pain is worse during the day. The symptoms are aggravated by standing and bending. Stiffness is present in the morning. Pertinent negatives include no abdominal pain, bladder incontinence, bowel incontinence, chest pain, dysuria, fever, headaches, leg pain, numbness, paresis, paresthesias, pelvic pain, perianal numbness, tingling, weakness or weight loss. She has tried NSAIDs, muscle relaxant and heat for the symptoms. The treatment provided mild relief.       Constitution: Negative for fever.   HENT: Negative.    Neck: negative.   Cardiovascular: Negative.  Negative for chest pain.   Eyes: Negative.    Respiratory: Negative.    Gastrointestinal: Negative for abdominal pain and bowel incontinence.   Genitourinary: Negative for dysuria, bladder incontinence and pelvic pain.   Musculoskeletal: Positive for pain, back pain, muscle cramps and muscle ache.   Skin: Negative.    Allergic/Immunologic: Negative.    Neurological: Negative for headaches and numbness.   Hematologic/Lymphatic: Negative.    Psychiatric/Behavioral: Negative.         Objective:      Physical Exam   Constitutional: She appears well-developed and  well-nourished.   HENT:   Head: Normocephalic and atraumatic.   Eyes: Pupils are equal, round, and reactive to light. EOM are normal.   Neck: Normal range of motion. Neck supple.   Cardiovascular: Normal rate and regular rhythm.   Pulmonary/Chest: Effort normal and breath sounds normal.   Musculoskeletal:        Thoracic back: She exhibits decreased range of motion, tenderness and pain. She exhibits no bony tenderness, no swelling, no edema, no deformity and no laceration.        Lumbar back: She exhibits decreased range of motion, tenderness and pain. She exhibits no bony tenderness, no swelling, no edema, no deformity, no laceration, no spasm and normal pulse.        Back:     pain persists about her lower back with flexion of her back to 45°, extension to 25°, and lateral bending and rotation to 25°.  She has no swelling or ecchymosis noted about her lower back.  She has complaints of pain with light touch about the lumbar and thoracic spine.  She has no focal neurologic deficits about her lower extremities.    Patient does show evidence of symptom magnification with disproportion complaints of pain on light touch about her lower back.  Pain with axial loading and pseudo trunk rotation.   Neurological: She is alert.   No focal neurologic deficits   Skin: Skin is warm.   Psychiatric: She has a normal mood and affect.   Nursing note and vitals reviewed.      Assessment:       1. Strain of lumbar region, subsequent encounter    2. Thoracic myofascial strain, subsequent encounter        Plan:     patient has not yet begun physical therapy to her thoracic and lumbar spine she will continue taking Naprosyn 500 mg b.i.d. with food for her back pain. She will also take tizanidine at night as needed.  Home exercises work again demonstrated to be done on a daily basis.         Patient Instructions: Daily home exercises/warm soaks, Begin Physical Therapy   Restrictions: No lifting/pushing/pulling more than 10 lbs, Avoid  frequent bending/lifting/twisting, Sit or stand as needed  Follow up in about 2 weeks (around 2/27/2020).

## 2020-02-13 NOTE — LETTER
[facility]  [address]  [place] [address]  Phone: [phone]  Fax: [phone]  [facility]: [id number]    Pt Name: Evelina Baez  Injury Date: 01/13/2020   Employee ID: [id number] Date of Treatment: 02/13/2020   Company:[employer]      Appointment Time: 11:15 AM Arrived: 11:30 AM   Provider: Chapito Monson MD Time Out: 1:35 PM     Office Treatment:   EXAM  BEGAN PHYSICAL THERAPY   LIGHT DUTY     1. Strain of lumbar region, subsequent encounter    2. Thoracic myofascial strain, subsequent encounter          Patient Instructions: Daily home exercises/warm soaks, Begin Physical Therapy    Restrictions: No lifting/pushing/pulling more than 10 lbs, Avoid frequent bending/lifting/twisting, Sit or stand as needed     Return Appointment: 02/27/2020 at 2:30 PM  IJ

## 2020-02-27 ENCOUNTER — OFFICE VISIT (OUTPATIENT)
Dept: URGENT CARE | Facility: CLINIC | Age: 25
End: 2020-02-27
Payer: OTHER MISCELLANEOUS

## 2020-02-27 DIAGNOSIS — S39.012D STRAIN OF LUMBAR REGION, SUBSEQUENT ENCOUNTER: Primary | ICD-10-CM

## 2020-02-27 DIAGNOSIS — M62.830 LUMBAR PARASPINAL MUSCLE SPASM: ICD-10-CM

## 2020-02-27 DIAGNOSIS — S29.019D THORACIC MYOFASCIAL STRAIN, SUBSEQUENT ENCOUNTER: ICD-10-CM

## 2020-02-27 DIAGNOSIS — M54.50 ACUTE BILATERAL LOW BACK PAIN WITHOUT SCIATICA: ICD-10-CM

## 2020-02-27 PROCEDURE — 99214 OFFICE O/P EST MOD 30 MIN: CPT | Mod: S$GLB,,, | Performed by: PREVENTIVE MEDICINE

## 2020-02-27 PROCEDURE — 99214 PR OFFICE/OUTPT VISIT, EST, LEVL IV, 30-39 MIN: ICD-10-PCS | Mod: S$GLB,,, | Performed by: PREVENTIVE MEDICINE

## 2020-02-27 RX ORDER — NAPROXEN 500 MG/1
500 TABLET ORAL 2 TIMES DAILY WITH MEALS
Qty: 60 TABLET | Refills: 1 | Status: SHIPPED | OUTPATIENT
Start: 2020-02-27 | End: 2021-02-26

## 2020-02-27 NOTE — LETTER
[facility]  [address] [place] [address]  Phone: [phone]  Fax: [phone]  [facility]: [id number]    Pt Name: Evelina Baez  Injury Date: 01/13/2020   Employee ID: [id number] Date of Treatment: 02/27/2020   Company: [employer]      Appointment Time: 02:15 PM Arrived: 2:35 PM   Provider: Chapito Monson MD Time Out: 3:35 PM     Office Treatment:   EXAM  RX GIVEN  BEGAN PHYSICAL THERAPY   HOME 02/27/2020-2/28/2020  LIGHT DUTY BEGAN 02/29/2020    1. Strain of lumbar region, subsequent encounter    2. Thoracic myofascial strain, subsequent encounter    3. Acute bilateral low back pain without sciatica    4. Lumbar paraspinal muscle spasm      Medications Ordered This Encounter   Medications    naproxen (NAPROSYN) 500 MG tablet      Patient Instructions: Daily home exercises/warm soaks, Begin Physical Therapy(Begin physical therapy to her low back and thoracic spine 3 times per week for 3 weeks.)    Restrictions: No lifting/pushing/pulling more than 10 lbs, Avoid frequent bending/lifting/twisting, Sit or stand as needed     Return Appointment: 03/12/2020 at 12:30 PM  IJ

## 2020-02-27 NOTE — PROGRESS NOTES
Subjective:       Patient ID: Evelina Baez is a 24 y.o. female.    Chief Complaint: Back Pain    W/C f/u- Lower back- Patient states her back feels worse since returning to work on light duty. She describes having muscle spasms as a stabbing sharp shooting pain that comes and goes. Pain level 5/10 on today.IJ    Back Pain   This is a recurrent problem. The current episode started more than 1 month ago. The problem occurs 2 to 4 times per day. The problem has been gradually improving since onset. The pain is present in the lumbar spine. The quality of the pain is described as shooting. The pain is at a severity of 5/10. The pain is moderate. The pain is worse during the day. The symptoms are aggravated by standing and bending. Stiffness is present in the morning. Pertinent negatives include no abdominal pain, bladder incontinence, bowel incontinence, chest pain, dysuria, fever, headaches, leg pain, numbness, paresis, paresthesias, pelvic pain, perianal numbness, tingling, weakness or weight loss. She has tried NSAIDs, muscle relaxant and heat for the symptoms. The treatment provided mild relief.       Constitution: Negative for fever.   Cardiovascular: Negative for chest pain.   Gastrointestinal: Negative for abdominal pain and bowel incontinence.   Genitourinary: Negative for dysuria, bladder incontinence and pelvic pain.   Musculoskeletal: Positive for back pain.   Neurological: Negative for headaches and numbness.        Objective:      Physical Exam   Constitutional: She appears well-developed and well-nourished.   HENT:   Head: Normocephalic and atraumatic.   Eyes: Pupils are equal, round, and reactive to light. EOM are normal.   Neck: Normal range of motion. Neck supple.   Cardiovascular: Normal rate and regular rhythm.   Pulmonary/Chest: Effort normal and breath sounds normal.   Musculoskeletal:        Thoracic back: She exhibits decreased range of motion, tenderness and pain. She exhibits no bony  tenderness, no swelling, no edema, no deformity and no laceration.        Lumbar back: She exhibits decreased range of motion, tenderness and pain. She exhibits no bony tenderness, no swelling, no edema, no deformity, no laceration, no spasm and normal pulse.        Back:    Patient has persistent pain about  lower back with flexion of her back to 45°, extension to 25°, and lateral bending and rotation to 25°.  She has no swelling or ecchymosis noted about her lower back.  She has complaints of pain with light touch about the lumbar and thoracic spine.  She has no focal neurologic deficits about her lower extremities.    Patient continues to show evidence of symptom magnification with disproportionate complaints of pain with light touch about her lower back.  Pain with axial loading and pseudo trunk rotation.   Neurological: She is alert.   No focal neurologic deficits   Skin: Skin is warm.   Psychiatric: She has a normal mood and affect.   Nursing note and vitals reviewed.      Assessment:       1. Strain of lumbar region, subsequent encounter    2. Thoracic myofascial strain, subsequent encounter    3. Acute bilateral low back pain without sciatica    4. Lumbar paraspinal muscle spasm        Plan:       Patient will continue taking tizanidine as a muscle relaxer at night.  She will remain home due to increased back pain until February 29th 2020.  Medications Ordered This Encounter   Medications    naproxen (NAPROSYN) 500 MG tablet     Sig: Take 1 tablet (500 mg total) by mouth 2 (two) times daily with meals.     Dispense:  60 tablet     Refill:  1     Patient Instructions: Daily home exercises/warm soaks, Begin Physical Therapy(Begin physical therapy to her low back and thoracic spine 3 times per week for 3 weeks.)   Restrictions: No lifting/pushing/pulling more than 10 lbs, Avoid frequent bending/lifting/twisting, Sit or stand as needed  Follow up in about 2 weeks (around 3/12/2020).

## 2020-03-05 ENCOUNTER — OFFICE VISIT (OUTPATIENT)
Dept: URGENT CARE | Facility: CLINIC | Age: 25
End: 2020-03-05
Payer: OTHER MISCELLANEOUS

## 2020-03-05 DIAGNOSIS — S39.012D STRAIN OF LUMBAR REGION, SUBSEQUENT ENCOUNTER: Primary | ICD-10-CM

## 2020-03-05 DIAGNOSIS — M54.50 ACUTE BILATERAL LOW BACK PAIN WITHOUT SCIATICA: ICD-10-CM

## 2020-03-05 DIAGNOSIS — M62.830 LUMBAR PARASPINAL MUSCLE SPASM: ICD-10-CM
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
## 2020-03-05 DIAGNOSIS — S29.019D THORACIC MYOFASCIAL STRAIN, SUBSEQUENT ENCOUNTER: ICD-10-CM

## 2020-03-05 PROCEDURE — 99213 OFFICE O/P EST LOW 20 MIN: CPT | Mod: S$GLB,,, | Performed by: NURSE PRACTITIONER

## 2020-03-05 PROCEDURE — 99213 PR OFFICE/OUTPT VISIT, EST, LEVL III, 20-29 MIN: ICD-10-PCS | Mod: S$GLB,,, | Performed by: NURSE PRACTITIONER

## 2020-03-05 NOTE — PROGRESS NOTES
Subjective:       Patient ID: Evelina Baez is a 24 y.o. female.    Chief Complaint: Back Pain    Pt returned to the clinic today for a follow up visit for back pain. Pt states her injury has improved a lot since her last visit and she has significantly less pain. IJ    Back Pain   This is a recurrent problem. The current episode started more than 1 month ago. The problem occurs rarely. The problem has been rapidly improving since onset. The pain is present in the lumbar spine. The quality of the pain is described as shooting. The pain is at a severity of 0/10. The patient is experiencing no pain. The pain is worse during the day. Pertinent negatives include no abdominal pain, bladder incontinence, bowel incontinence, chest pain, dysuria, fever, headaches, leg pain, numbness, paresis, paresthesias, pelvic pain, perianal numbness, tingling, weakness or weight loss. She has tried NSAIDs, muscle relaxant and heat for the symptoms. The treatment provided mild relief.       Constitution: Negative for fatigue and fever.   Neck: Positive for neck stiffness. Negative for neck pain and painful lymph nodes.   Cardiovascular: Negative for chest pain and palpitations.   Eyes: Negative for double vision and blurred vision.   Respiratory: Negative for asthma.    Gastrointestinal: Negative for abdominal pain and bowel incontinence.   Endocrine: cold intolerance and heat intolerance.   Genitourinary: Negative for dysuria, urgency, bladder incontinence, hematuria, missed menses and pelvic pain.   Musculoskeletal: Positive for back pain. Negative for muscle cramps, muscle ache and history of spine disorder.   Skin: Negative for color change, rash and erythema.   Allergic/Immunologic: Negative for environmental allergies, seasonal allergies, asthma and immunocompromised state.   Neurological: Negative for coordination disturbances, headaches, numbness and tingling.   Hematologic/Lymphatic: Negative for swollen lymph nodes and  easy bruising/bleeding. Does not bruise/bleed easily.        Objective:      Physical Exam   Constitutional: She appears well-developed and well-nourished.   HENT:   Head: Normocephalic and atraumatic.   Eyes: Pupils are equal, round, and reactive to light. EOM are normal.   Neck: Normal range of motion. Neck supple.   Cardiovascular: Normal rate and regular rhythm.   Pulmonary/Chest: Effort normal and breath sounds normal.   Musculoskeletal:        Cervical back: Normal.        Thoracic back: She exhibits decreased range of motion and tenderness. She exhibits no bony tenderness, no swelling, no edema, no deformity, no laceration, no pain and no spasm.        Lumbar back: She exhibits decreased range of motion, tenderness and pain. She exhibits no bony tenderness, no swelling, no edema, no deformity, no laceration, no spasm and normal pulse.        Back:    Neurological: She is alert. She has normal reflexes. No cranial nerve deficit or sensory deficit. Gait normal. GCS eye subscore is 4. GCS verbal subscore is 5. GCS motor subscore is 6.   No focal neurologic deficits   Skin: Skin is warm, dry and intact. No erythema.   Psychiatric: She has a normal mood and affect.   Nursing note and vitals reviewed.      Assessment:       1. Strain of lumbar region, subsequent encounter    2. Thoracic myofascial strain, subsequent encounter    3. Acute bilateral low back pain without sciatica    4. Lumbar paraspinal muscle spasm    5. Work related injury        Plan:       Evelina was seen today for back pain.    Diagnoses and all orders for this visit:    Strain of lumbar region, subsequent encounter    Thoracic myofascial strain, subsequent encounter    Acute bilateral low back pain without sciatica    Lumbar paraspinal muscle spasm    Work related injury       Patient to increase activity and work hours (  8 hour shifts with changes to restrictions- see CA-17)   Will assess these changes next office visit.  Patient has not  started pt as it has not been authorized.  Discussed a length the importance of daily stretches and home exercises.  Patient Instructions: Daily home exercises/warm soaks, PT to be scheduled once authorized   Restrictions: (see ca-17 as changes have been made and patient is to work 8 hour shifts .  rtc in one week)  Follow up in about 1 week (around 3/12/2020).

## 2020-03-05 NOTE — PATIENT INSTRUCTIONS
Exercises to Strengthen Your Lower Back  Strong lower back and abdominal muscles work together to support your spine. The exercises below will help strengthen the lower back. It is important that you begin exercising slowly and increase levels gradually.  Always begin any exercise program with stretching. If you feel pain while doing any of these exercises, stop and talk to your doctor about a more specific exercise program that better suits your condition.   Low back stretch  The point of stretching is to make you more flexible and increase your range of motion. Stretch only as much as you are able. Stretch slowly. Do not push your stretch to the limit. If at any point you feel pain while stretching, this is your (temporary) limit.  · Lie on your back with your knees bent and both feet on the ground.  · Slowly raise your left knee to your chest as you flatten your lower back against the floor. Hold for 5 seconds.  · Relax and repeat the exercise with your right knee.  · Do 10 of these exercises for each leg.  · Repeat hugging both knees to your chest at the same time.  Building lower back strength  Start your exercise routine with 10 to 30 minutes a day, 1 to 3 times a day.  Initial exercises  Lying on your back:  1. Ankle pumps: Move your foot up and down, towards your head, and then away. Repeat 10 times with each foot.  2. Heel slides: Slowly bend your knee, drawing the heel of your foot towards you. Then slide your heel/foot from you, straightening your knee. Do not lift your foot off the floor (this is not a leg lift).  3. Abdominal contraction: Bend your knees and put your hands on your stomach. Tighten your stomach muscles. Hold for 5 seconds, then relax. Repeat 10 times.  4. Straight leg raise: Bend one leg at the knee and keep the other leg straight. Tighten your stomach muscles. Slowly lift your straight leg 6 to 12 inches off the floor and hold for up to 5 seconds. Repeat 10 times on each  side.  Standin. Wall squats: Stand with your back against the wall. Move your feet about 12 inches away from the wall. Tighten your stomach muscles, and slowly bend your knees until they are at about a 45 degree angle. Do not go down too far. Hold about 5 seconds. Then slowly return to your starting position. Repeat 10 times.  2. Heel raises: Stand facing the wall. Slowly raise the heels of your feet up and down, while keeping your toes on the floor. If you have trouble balancing, you can touch the wall with your hands. Repeat 10 times.  More advanced exercises  When you feel comfortable enough, try these exercises.  1. Kneeling lumbar extension: Begin on your hands and knees. At the same time, raise and straighten your right arm and left leg until they are parallel to the ground. Hold for 2 seconds and come back slowly to a starting position. Repeat with left arm and right leg, alternating 10 times.  2. Prone lumbar extension: Lie face down, arms extended overhead, palms on the floor. At the same time, raise your right arm and left leg as high as comfortably possible. Hold for 10 seconds and slowly return to start. Repeat with left arm and right leg, alternating 10 times. Gradually build up to 20 times. (Advanced: Repeat this exercise raising both arms and both legs a few inches off the floor at the same time. Hold for 5 seconds and release.)  3. Pelvic tilt: Lie on the floor on your back with your knees bent at 90 degrees. Your feet should be flat on the floor. Inhale, exhale, then slowly contract your abdominal muscles bringing your navel toward your spine. Let your pelvis rock back until your lower back is flat on the floor. Hold for 10 seconds while breathing smoothly.  4. Abdominal crunch: Perform a pelvic tilt (above) flattening your lower back against the floor. Holding the tension in your abdominal muscles, take another breath and raise your shoulder blades off the ground (this is not a full sit-up).  Keep your head in line with your body (dont bend your neck forward). Hold for 2 seconds, then slowly lower.  Date Last Reviewed: 6/1/2016  © 9551-1044 [employer]. [address] [place] [address]. All rights reserved. This information is not intended as a substitute for professional medical care. Always follow your healthcare professional's instructions.        Back Safety: Poor Posture Hurts  An unhealthy spine often starts with bad habits. Poor movement patterns and posture problems are common causes of back pain. Disk, bone, nerve, and soft tissue problems can all be affected by poor posture. They can lead to pain, stiffness, and other symptoms.    Poor posture backfires  Poor posture can cause pain. Too much slouching puts increased pressure on the disks. An excessive lumbar curve can overload and inflame the vertebrae. As a result, the back muscles may tighten or spasm to splint and protect the spine. This adds to the pain you feel.    Proper posture: The key to safe movement  Your spine bears your weight throughout the day. This is true whether youre sleeping, standing, or bending. Certain positions strain your spine more than others. But by maintaining proper posture in all positions, you can reduce the stress on your spine.       To improve your standing posture, follow these steps:  · Breathe deeply.  · Relax your shoulders, hips, and ankles. · Think of the ears, shoulders, hips, and ankles as a series of dots. Now, adjust your body to connect the dots in a straight line.  · Tuck your buttocks in just a bit if you need to.      Date Last Reviewed: 10/28/2015  © 1168-6294 [employer]. [address] [place] [address]. All rights reserved. This information is not intended as a substitute for professional medical care. Always follow your healthcare professional's instructions.        Self-Care for Low Back Pain    Most people have low back pain now and then.  In many cases, it isnt serious and self-care can help. Sometimes low back pain can be a sign of a bigger problem. Call your healthcare provider if your pain returns often or gets worse over time. For the long-term care of your back, get regular exercise, lose any excess weight and learn good posture.  Take a short rest  Lying down during the day may be beneficial for short periods of time if severe pain increases with sitting or standing. Long-term bed rest could be detrimental.  Reduce pain and swelling  Cold reduces swelling. Both cold and heat can reduce pain. Protect your skin by placing a towel between your body and the ice or heat source.  · For the first few days, apply an ice pack for 15 to 20 minutes .  · After the first few days, try heat for 15 minutes at a time to ease pain. Never sleep on a heating pad.  · Over-the-counter medicine can help control pain and swelling. Try aspirin or ibuprofen.  Exercise  Exercise can help your back heal. It also helps your back get stronger and more flexible, preventing any reinjury. Ask your healthcare provider about specific exercises for your back.  Use good posture to avoid reinjury  · When moving, bend at the hips and knees. Dont bend at the waist or twist around.  · When lifting, keep the object close to your body. Dont try to lift more than you can handle.  · When sitting, keep your lower back supported. Use a rolled-up towel as needed.  Seek immediate medical care if:  · Youre unable to stand or walk.  · You have a temperature over 100.4°F (38.0°C)  · You have frequent, painful, or bloody urination.  · You have severe abdominal pain.  · You have a sharp, stabbing pain.  · Your pain is constant.  · You have pain or numbness in your leg.  · You feel pain in a new area of your back.  · You notice that the pain isnt decreasing after more than a week.   Date Last Reviewed: 9/29/2015  © 3126-6323 [employer]. [address] [place] [address], [place]  [address]. All rights reserved. This information is not intended as a substitute for professional medical care. Always follow your healthcare professional's instructions.

## 2020-03-05 NOTE — LETTER
[facility]  [address] [address]  Phone: [phone]  Fax: [phone]  [facility]: [id number]    Pt Name: Evelina Baez  Injury Date: 01/13/2020   Employee ID: [id number] Date of Treatment: 03/05/2020   Company: [employer]      Appointment Time: 01:30 PM Arrived: 1:40 p.m.   Provider: Guanakito Spaulding NP Time Out: 2:20 p.m.     Office Treatment:   EXAM  PT to be scheduled  Restrictions    1. Strain of lumbar region, subsequent encounter    2. Thoracic myofascial strain, subsequent encounter    3. Acute bilateral low back pain without sciatica    4. Lumbar paraspinal muscle spasm    5. Work related injury          Patient Instructions: Daily home exercises/warm soaks, PT to be scheduled once authorized    Restrictions: (see ca-17 as changes have been made and patient is to work 8 hour shifts .  rtc in one week)     Return Appointment: 3/12/2020 at 3:00 p.m.  [place]

## 2020-03-17 ENCOUNTER — OFFICE VISIT (OUTPATIENT)
Dept: URGENT CARE | Facility: CLINIC | Age: 25
End: 2020-03-17
Payer: OTHER MISCELLANEOUS

## 2020-03-17 DIAGNOSIS — S39.012D STRAIN OF LUMBAR REGION, SUBSEQUENT ENCOUNTER: Primary | ICD-10-CM

## 2020-03-17 DIAGNOSIS — M62.830 LUMBAR PARASPINAL MUSCLE SPASM: ICD-10-CM

## 2020-03-17 DIAGNOSIS — S29.019D THORACIC MYOFASCIAL STRAIN, SUBSEQUENT ENCOUNTER: ICD-10-CM

## 2020-03-17 DIAGNOSIS — M54.50 ACUTE BILATERAL LOW BACK PAIN WITHOUT SCIATICA: ICD-10-CM

## 2020-03-17 PROCEDURE — 99214 OFFICE O/P EST MOD 30 MIN: CPT | Mod: S$GLB,,, | Performed by: PREVENTIVE MEDICINE

## 2020-03-17 PROCEDURE — 99214 PR OFFICE/OUTPT VISIT, EST, LEVL IV, 30-39 MIN: ICD-10-PCS | Mod: S$GLB,,, | Performed by: PREVENTIVE MEDICINE

## 2020-03-17 NOTE — LETTER
[facility]  [address] [place] [address]  Phone: [phone]  Fax: [phone]  [facility]: [id number]    Pt Name: Evelina Baez  Injury Date: 01/13/2020   Employee ID: [id number] Date of  Treatment: 03/17/2020   Company: [employer]      Appointment Time: 09:30 AM Arrived: 9:45 AM   Provider: Chapito Monson MD Time Out: 10:45 AM     Office Treatment:  EXAM  DISCHARGED FROM [facility]   REGULAR DUTY            1. Strain of lumbar region, subsequent encounter    2. Thoracic myofascial strain, subsequent encounter    3. Acute bilateral low back pain without sciatica    4. Lumbar paraspinal muscle spasm         Restrictions: REGULAR DUTY. DISCHARGED FROM [facility]             Return Appointment: NONE

## 2020-03-17 NOTE — PROGRESS NOTES
Subjective:       Patient ID: Evelina Baez is a 24 y.o. female.    Chief Complaint: Back Pain    Pt returned to the clinic today for a follow up visit for back pain. Pt states her injury has improved a lot since her last visit and she has significantly less pain. IJ    Back Pain   This is a recurrent problem. The current episode started more than 1 month ago. The problem occurs rarely. The problem has been rapidly improving since onset. The pain is present in the lumbar spine. The quality of the pain is described as shooting. The pain is at a severity of 1/10. The patient is experiencing no pain. The pain is worse during the day. Pertinent negatives include no abdominal pain, bladder incontinence, bowel incontinence, chest pain, dysuria, fever, headaches, leg pain, numbness, paresis, paresthesias, pelvic pain, perianal numbness, tingling, weakness or weight loss. She has tried NSAIDs, muscle relaxant and heat for the symptoms. The treatment provided mild relief.       Constitution: Negative for fatigue and fever.   Neck: Positive for neck stiffness. Negative for neck pain and painful lymph nodes.   Cardiovascular: Negative for chest pain and palpitations.   Eyes: Negative for double vision and blurred vision.   Respiratory: Negative for asthma.    Gastrointestinal: Negative for abdominal pain and bowel incontinence.   Endocrine: cold intolerance and heat intolerance.   Genitourinary: Negative for dysuria, urgency, bladder incontinence, hematuria, missed menses and pelvic pain.   Musculoskeletal: Positive for back pain. Negative for muscle cramps, muscle ache and history of spine disorder.   Skin: Negative for color change, rash and erythema.   Allergic/Immunologic: Negative for environmental allergies, seasonal allergies, asthma and immunocompromised state.   Neurological: Negative for coordination disturbances, headaches, numbness and tingling.   Hematologic/Lymphatic: Negative for swollen lymph nodes and  easy bruising/bleeding. Does not bruise/bleed easily.        Objective:      Physical Exam   Constitutional: She appears well-developed and well-nourished.   HENT:   Head: Normocephalic and atraumatic.   Eyes: Pupils are equal, round, and reactive to light. EOM are normal.   Neck: Normal range of motion. Neck supple.   Cardiovascular: Normal rate and regular rhythm.   Pulmonary/Chest: Effort normal and breath sounds normal.   Musculoskeletal:        Thoracic back: She exhibits tenderness. She exhibits normal range of motion, no bony tenderness, no swelling, no edema, no deformity, no laceration and no pain.        Lumbar back: She exhibits tenderness. She exhibits normal range of motion, no bony tenderness, no swelling, no edema, no deformity, no laceration, no pain, no spasm and normal pulse.   Much less pain about lower back with flexion of her back to 90°, extension to 25°, and lateral bending and rotation to 25°.  She has no swelling or ecchymosis noted about her lower back.  She has complaints of pain with light touch about the lumbar and thoracic spine.  She has no focal neurologic deficits about her lower extremities.   Neurological: She is alert.   No focal neurologic deficits   Skin: Skin is warm. No erythema.   Psychiatric: She has a normal mood and affect.   Nursing note and vitals reviewed.      Assessment:       1. Strain of lumbar region, subsequent encounter    2. Thoracic myofascial strain, subsequent encounter    3. Acute bilateral low back pain without sciatica    4. Lumbar paraspinal muscle spasm        Plan:       Patient will continue exercises for her lower back with warm soaks daily.  She may resume regular duty as tolerated.  She will return to clinic as needed.     Patient Instructions: Daily home exercises/warm soaks, Discontinue Physical Therapy   Restrictions: Regular Duty  Follow up if symptoms worsen or fail to improve.

## 2020-04-06 ENCOUNTER — TELEPHONE (OUTPATIENT)
Dept: URGENT CARE | Facility: CLINIC | Age: 25
End: 2020-04-06

## 2020-04-06 NOTE — TELEPHONE ENCOUNTER
Unable to leave a voice message as the mailbox is full.  The patient needs to contact her case file worker to discover what is lacking as her claim is still under investigation.

## 2021-04-09 ENCOUNTER — OFFICE VISIT (OUTPATIENT)
Dept: URGENT CARE | Facility: CLINIC | Age: 26
End: 2021-04-09
Payer: COMMERCIAL

## 2021-04-09 VITALS
OXYGEN SATURATION: 100 % | BODY MASS INDEX: 19.93 KG/M2 | WEIGHT: 127 LBS | RESPIRATION RATE: 16 BRPM | HEIGHT: 67 IN | SYSTOLIC BLOOD PRESSURE: 123 MMHG | DIASTOLIC BLOOD PRESSURE: 85 MMHG | HEART RATE: 93 BPM | TEMPERATURE: 98 F

## 2021-04-09 DIAGNOSIS — B96.89 ACUTE BACTERIAL SINUSITIS: ICD-10-CM

## 2021-04-09 DIAGNOSIS — J01.90 ACUTE BACTERIAL SINUSITIS: ICD-10-CM

## 2021-04-09 DIAGNOSIS — H65.02 ACUTE SEROUS OTITIS MEDIA OF LEFT EAR, RECURRENCE NOT SPECIFIED: ICD-10-CM

## 2021-04-09 DIAGNOSIS — R05.9 COUGH: Primary | ICD-10-CM

## 2021-04-09 PROCEDURE — 99213 PR OFFICE/OUTPT VISIT, EST, LEVL III, 20-29 MIN: ICD-10-PCS | Mod: S$GLB,,, | Performed by: NURSE PRACTITIONER

## 2021-04-09 PROCEDURE — 3008F PR BODY MASS INDEX (BMI) DOCUMENTED: ICD-10-PCS | Mod: CPTII,S$GLB,, | Performed by: NURSE PRACTITIONER

## 2021-04-09 PROCEDURE — 99213 OFFICE O/P EST LOW 20 MIN: CPT | Mod: S$GLB,,, | Performed by: NURSE PRACTITIONER

## 2021-04-09 PROCEDURE — 3008F BODY MASS INDEX DOCD: CPT | Mod: CPTII,S$GLB,, | Performed by: NURSE PRACTITIONER

## 2021-04-09 RX ORDER — AZITHROMYCIN 250 MG/1
250 TABLET, FILM COATED ORAL DAILY
Qty: 6 TABLET | Refills: 0 | Status: SHIPPED | OUTPATIENT
Start: 2021-04-09 | End: 2021-04-14

## 2021-04-09 RX ORDER — PREDNISONE 10 MG/1
10 TABLET ORAL DAILY
Qty: 5 TABLET | Refills: 0 | Status: SHIPPED | OUTPATIENT
Start: 2021-04-09 | End: 2021-04-14

## 2021-04-19 NOTE — LETTER
[facility]  [address] [address]  Phone: [phone]  Fax: [phone]  [facility]: [id number]    Pt Name: Evelina Baez  Injury Date: 01/13/2020   Employee ID: [id number] Date of Treatment: 02/05/2020   Company: [employer]      Appointment Time: 10:15 AM Arrived: 10:30 AM   Provider: Raúl Jenkins PA-C Time Out: 12:10 PM     Office Treatment:   EXAM  RX GIVEN  PT TO BE SCHEDULED ONCE AUTHORIZED   LIGHT DUTY     1. Strain of lumbar region, subsequent encounter    2. Thoracic myofascial strain, subsequent encounter    3. Work related injury      Medications Ordered This Encounter   Medications    naproxen (EC NAPROSYN) 500 MG EC tablet      Patient Instructions: Daily home exercises/warm soaks, PT to be scheduled once authorized    Restrictions: (See CA-17)     Return Appointment: 02/13/2020 at 11:30 AM  IJ          Refill Request     Last Seen: 12/22/2020    Last Written: 4/6/2020    Next Appointment:   Future Appointments   Date Time Provider Marylu Lujan   5/14/2021  1:00 PM DO BEE Stacy             Requested Prescriptions     Pending Prescriptions Disp Refills    potassium chloride (KLOR-CON M) 10 MEQ extended release tablet [Pharmacy Med Name: POTASSIUM CHLORIDE ER M-10 MEQ TAB] 60 tablet 0     Sig: TAKE ONE TABLET BY MOUTH TWICE A DAY

## 2023-02-23 ENCOUNTER — OFFICE VISIT (OUTPATIENT)
Dept: URGENT CARE | Facility: CLINIC | Age: 28
End: 2023-02-23
Payer: COMMERCIAL

## 2023-02-23 VITALS
RESPIRATION RATE: 18 BRPM | BODY MASS INDEX: 19.62 KG/M2 | OXYGEN SATURATION: 98 % | WEIGHT: 125 LBS | HEIGHT: 67 IN | TEMPERATURE: 99 F | HEART RATE: 90 BPM | SYSTOLIC BLOOD PRESSURE: 123 MMHG | DIASTOLIC BLOOD PRESSURE: 80 MMHG

## 2023-02-23 DIAGNOSIS — M25.561 ACUTE PAIN OF RIGHT KNEE: Primary | ICD-10-CM

## 2023-02-23 PROCEDURE — 99214 OFFICE O/P EST MOD 30 MIN: CPT | Mod: S$GLB,,,

## 2023-02-23 PROCEDURE — 3008F BODY MASS INDEX DOCD: CPT | Mod: CPTII,S$GLB,,

## 2023-02-23 PROCEDURE — 73562 XR KNEE 3 VIEW RIGHT: ICD-10-PCS | Mod: RT,S$GLB,, | Performed by: RADIOLOGY

## 2023-02-23 PROCEDURE — 3074F PR MOST RECENT SYSTOLIC BLOOD PRESSURE < 130 MM HG: ICD-10-PCS | Mod: CPTII,S$GLB,,

## 2023-02-23 PROCEDURE — 3008F PR BODY MASS INDEX (BMI) DOCUMENTED: ICD-10-PCS | Mod: CPTII,S$GLB,,

## 2023-02-23 PROCEDURE — 3079F DIAST BP 80-89 MM HG: CPT | Mod: CPTII,S$GLB,,

## 2023-02-23 PROCEDURE — 1159F MED LIST DOCD IN RCRD: CPT | Mod: CPTII,S$GLB,,

## 2023-02-23 PROCEDURE — 1159F PR MEDICATION LIST DOCUMENTED IN MEDICAL RECORD: ICD-10-PCS | Mod: CPTII,S$GLB,,

## 2023-02-23 PROCEDURE — 1160F PR REVIEW ALL MEDS BY PRESCRIBER/CLIN PHARMACIST DOCUMENTED: ICD-10-PCS | Mod: CPTII,S$GLB,,

## 2023-02-23 PROCEDURE — 3074F SYST BP LT 130 MM HG: CPT | Mod: CPTII,S$GLB,,

## 2023-02-23 PROCEDURE — 3079F PR MOST RECENT DIASTOLIC BLOOD PRESSURE 80-89 MM HG: ICD-10-PCS | Mod: CPTII,S$GLB,,

## 2023-02-23 PROCEDURE — 99214 PR OFFICE/OUTPT VISIT, EST, LEVL IV, 30-39 MIN: ICD-10-PCS | Mod: S$GLB,,,

## 2023-02-23 PROCEDURE — 73562 X-RAY EXAM OF KNEE 3: CPT | Mod: RT,S$GLB,, | Performed by: RADIOLOGY

## 2023-02-23 PROCEDURE — 1160F RVW MEDS BY RX/DR IN RCRD: CPT | Mod: CPTII,S$GLB,,

## 2023-02-23 RX ORDER — NAPROXEN 500 MG/1
500 TABLET ORAL 2 TIMES DAILY
Qty: 14 TABLET | Refills: 0 | Status: SHIPPED | OUTPATIENT
Start: 2023-02-23 | End: 2023-03-02

## 2023-02-23 NOTE — PROGRESS NOTES
"Subjective:       Patient ID: Evelina Baez is a 27 y.o. female.    Vitals:  height is 5' 7" (1.702 m) and weight is 56.7 kg (125 lb). Her oral temperature is 98.8 °F (37.1 °C). Her blood pressure is 123/80 and her pulse is 90. Her respiration is 18 and oxygen saturation is 98%.     Chief Complaint: Knee Pain    Patient states her right knee is hurting her since yesterday. Stated she did not injury the knee. Pain worsens with bending the knee. Pain with walking. Admits to right knee swelling.     Knee Pain   The incident occurred 12 to 24 hours ago. The incident occurred at home. There was no injury mechanism. The pain is present in the right knee. The quality of the pain is described as aching and stabbing. The pain is at a severity of 10/10. The pain is severe. The pain has been Constant since onset. Associated symptoms include an inability to bear weight. Pertinent negatives include no loss of motion, loss of sensation, numbness or tingling. She reports no foreign bodies present. The symptoms are aggravated by weight bearing and movement. She has tried ice for the symptoms. The treatment provided no relief.     Musculoskeletal:  Positive for joint pain, joint swelling and abnormal ROM of joint. Negative for trauma.   Neurological:  Negative for numbness and tingling.     Objective:      Physical Exam   Constitutional: She is oriented to person, place, and time. She appears well-developed. She is cooperative.  Non-toxic appearance. She does not appear ill. No distress.   HENT:   Head: Normocephalic and atraumatic.   Nose: Nose normal.   Mouth/Throat: Oropharynx is clear and moist and mucous membranes are normal.   Eyes: Conjunctivae and lids are normal.   Neck: Trachea normal and phonation normal. Neck supple.   Cardiovascular: Normal rate and normal pulses.   Pulmonary/Chest: Effort normal. No stridor. No respiratory distress.   Abdominal: Normal appearance.   Musculoskeletal:         General: Swelling and " tenderness present. No deformity.      Right knee: She exhibits swelling. She exhibits no erythema and no bony tenderness. Tenderness (medial aspect of knee) found.      Left knee: Normal.      Comments: Right knee swelling with tenderness to palpation of medial aspect of knee. No erythema or ecchymosis noted. Patient is able to flex and extend knee joint but with pain. No bony point tenderness noted. No pain with palpation and manipulation of patella. Patient ambulates favoring left leg. No pain with posterior knee palpation, no nodule or cyst noted. No calf swelling or tenderness.   Neurological: She is alert and oriented to person, place, and time. She has normal strength and normal reflexes. No sensory deficit.   Skin: Skin is warm, dry, intact and not diaphoretic.   Psychiatric: Her speech is normal and behavior is normal. Judgment and thought content normal.   Nursing note and vitals reviewed.    X-Ray Knee 3 View Right    Result Date: 2/23/2023  EXAMINATION: XR KNEE 3 VIEW RIGHT CLINICAL HISTORY: Pain in right knee TECHNIQUE: AP, lateral, and Merchant views of the right knee were performed. COMPARISON: None FINDINGS: No evidence of acute displaced fracture, dislocation, or osseous destructive process.  Joint spaces are preserved.  No significant suprapatellar joint effusion.     No acute osseous abnormality identified. Electronically signed by: Sadia Hernandez MD Date:    02/23/2023 Time:    17:51     Assessment:       1. Acute pain of right knee          Plan:         Acute pain of right knee  -     X-Ray Knee 3 View Right; Future; Expected date: 02/23/2023  -     Ambulatory referral/consult to Orthopedics  -     naproxen (NAPROSYN) 500 MG tablet; Take 1 tablet (500 mg total) by mouth 2 (two) times daily. for 7 days  Dispense: 14 tablet; Refill: 0       Reviewed  right knee xray in PACS- no acute dislocation or fracture noted. No joint effusion present. Patella does not show fracture. Joint space is  preserved.     Discussed with patient to ice joint and elevate knee. Alternate Ibuprofen and Tylenol as needed for pain. Compression can help with swelling. Referral to orthopedics sent and copy printed for patient. CD disc with knee xray images given to patient. Right knee wrapped with compression bandage in clinic.  Avoid overuse of injured knee, strenuous activity involving joint.      Discussed with patient the importance of f/u with their primary care provider. Urged to go to the ER for any worsening signs or symptoms.      Rest. Allow your injury to heal before you do slow movements.  Place an ice pack or a bag of frozen peas wrapped in a towel over the painful part. Never put ice right on the skin. Do not leave the ice on more than 10 to 15 minutes at a time. Ice after activity may help decrease pain and swelling. Never ice before stretching.  Prop your knee on pillows to help with swelling.  Use a knee brace if the doctor tells you to do this.  Wear good supportive shoes. Get inserts for your shoes if you have flat feet.  Do exercises for stretching and strengthening.

## 2023-03-08 ENCOUNTER — TELEPHONE (OUTPATIENT)
Dept: SPORTS MEDICINE | Facility: CLINIC | Age: 28
End: 2023-03-08
Payer: COMMERCIAL

## 2023-03-08 NOTE — TELEPHONE ENCOUNTER
Patient was informed that she will need to come at 2:30 pm for x-rays. Patient stated that she has x-rays on a disc, but was informed that she may need weight bearing,. Patient stated that she will try to arrive at 2:30 pm just because she was offered at 4 pm that will work.

## 2023-07-11 ENCOUNTER — PATIENT MESSAGE (OUTPATIENT)
Dept: RESEARCH | Facility: HOSPITAL | Age: 28
End: 2023-07-11
Payer: COMMERCIAL

## 2024-04-11 NOTE — PROGRESS NOTES
Provider at bedside.     Crystal Dong RN  04/11/24 0817     Subjective:       Patient ID: Evelina Baez is a 24 y.o. female.    Chief Complaint: Back Pain (Lumbar strain)    W/C f/u- Lower back- Patient states her back does not feel any better. She states she has muscle spasms radiating up her back. Medication does not seem to be working. Pain level 7/10 on today. [place]    Back Pain   This is a recurrent problem. The current episode started 1 to 4 weeks ago. The problem occurs constantly. The problem is unchanged. The pain is present in the lumbar spine. The quality of the pain is described as stabbing and burning. The pain is at a severity of 7/10. The pain is moderate. The pain is worse during the night. The symptoms are aggravated by lying down and twisting. Stiffness is present at night. Pertinent negatives include no abdominal pain, bladder incontinence, bowel incontinence, chest pain, dysuria, fever, headaches, leg pain, numbness, paresis, paresthesias, pelvic pain, perianal numbness, tingling or weakness. She has tried heat, ice and NSAIDs for the symptoms. The treatment provided mild relief.       Constitution: Negative for fatigue and fever.   Cardiovascular: Negative for chest pain.   Gastrointestinal: Negative for abdominal pain and bowel incontinence.   Genitourinary: Negative for dysuria, urgency, bladder incontinence, hematuria and pelvic pain.   Musculoskeletal: Positive for pain, back pain, muscle cramps and muscle ache. Negative for history of spine disorder.   Skin: Negative for rash.   Neurological: Negative for coordination disturbances, headaches, numbness and tingling.        Objective:      Physical Exam   Constitutional: She appears well-developed and well-nourished. She is active. No distress.   HENT:   Head: Normocephalic and atraumatic.   Right Ear: Hearing and external ear normal.   Left Ear: Hearing and external ear normal.   Nose: Nose normal. No nasal deformity. No epistaxis.   Mouth/Throat: Oropharynx is clear and moist and mucous membranes  are normal.   Eyes: Conjunctivae and lids are normal. Right conjunctiva is not injected. Left conjunctiva is not injected.   Neck: Trachea normal and normal range of motion. No spinous process tenderness and no muscular tenderness present.   Cardiovascular: Intact distal pulses and normal pulses.   Pulses:       Dorsalis pedis pulses are 2+ on the right side, and 2+ on the left side.        Posterior tibial pulses are 2+ on the right side, and 2+ on the left side.   Pulmonary/Chest: Effort normal. No stridor. No respiratory distress.   Abdominal: Normal appearance.   Musculoskeletal: She exhibits tenderness.        Cervical back: Normal.        Thoracic back: Normal.        Lumbar back: She exhibits decreased range of motion, tenderness and spasm. She exhibits no deformity and normal pulse.        Back:    Neurological: She is alert. She has normal strength and normal reflexes. No sensory deficit. GCS eye subscore is 4. GCS verbal subscore is 5. GCS motor subscore is 6.   Reflex Scores:       Patellar reflexes are 2+ on the right side and 2+ on the left side.       Achilles reflexes are 2+ on the right side and 2+ on the left side.  SLR negative bilaterally.    Skin: Skin is warm, dry and intact. No abrasion and no bruising noted.   Psychiatric: She has a normal mood and affect. Her speech is normal and behavior is normal. Thought content normal. Cognition and memory are normal. She is attentive.   Nursing note and vitals reviewed.      Assessment:       1. Acute bilateral low back pain without sciatica    2. Lumbar paraspinal muscle spasm    3. Strain of lumbar region, subsequent encounter        Plan:            Patient Instructions: Attention not to aggravate affected area, Apply ice 24-48 hours then apply heat/warm soaks   Restrictions: No Prolonged standing/walking, No lifting/pushing/pulling more than 10 lbs, Avoid frequent bending/lifting/twisting, Avoid climbing/kneeling/squatting  Follow up in about 1 week  (around 1/27/2020).